# Patient Record
Sex: FEMALE | Race: WHITE | NOT HISPANIC OR LATINO
[De-identification: names, ages, dates, MRNs, and addresses within clinical notes are randomized per-mention and may not be internally consistent; named-entity substitution may affect disease eponyms.]

---

## 2017-12-30 ENCOUNTER — TRANSCRIPTION ENCOUNTER (OUTPATIENT)
Age: 37
End: 2017-12-30

## 2020-02-05 PROBLEM — Z00.00 ENCOUNTER FOR PREVENTIVE HEALTH EXAMINATION: Status: ACTIVE | Noted: 2020-02-05

## 2020-02-28 ENCOUNTER — APPOINTMENT (OUTPATIENT)
Dept: BREAST CENTER | Facility: CLINIC | Age: 40
End: 2020-02-28

## 2020-03-11 ENCOUNTER — APPOINTMENT (OUTPATIENT)
Dept: BREAST CENTER | Facility: CLINIC | Age: 40
End: 2020-03-11
Payer: COMMERCIAL

## 2020-03-11 VITALS
HEIGHT: 65 IN | DIASTOLIC BLOOD PRESSURE: 79 MMHG | BODY MASS INDEX: 24.99 KG/M2 | WEIGHT: 150 LBS | SYSTOLIC BLOOD PRESSURE: 118 MMHG | HEART RATE: 68 BPM

## 2020-03-11 DIAGNOSIS — Z82.49 FAMILY HISTORY OF ISCHEMIC HEART DISEASE AND OTHER DISEASES OF THE CIRCULATORY SYSTEM: ICD-10-CM

## 2020-03-11 DIAGNOSIS — Z86.39 PERSONAL HISTORY OF OTHER ENDOCRINE, NUTRITIONAL AND METABOLIC DISEASE: ICD-10-CM

## 2020-03-11 DIAGNOSIS — Z86.79 PERSONAL HISTORY OF OTHER DISEASES OF THE CIRCULATORY SYSTEM: ICD-10-CM

## 2020-03-11 DIAGNOSIS — Z78.9 OTHER SPECIFIED HEALTH STATUS: ICD-10-CM

## 2020-03-11 DIAGNOSIS — N64.4 MASTODYNIA: ICD-10-CM

## 2020-03-11 DIAGNOSIS — R92.1 MAMMOGRAPHIC CALCIFICATION FOUND ON DIAGNOSTIC IMAGING OF BREAST: ICD-10-CM

## 2020-03-11 PROCEDURE — 99204 OFFICE O/P NEW MOD 45 MIN: CPT

## 2020-03-11 NOTE — PHYSICAL EXAM
[Normocephalic] : normocephalic [Atraumatic] : atraumatic [Supple] : supple [No Supraclavicular Adenopathy] : no supraclavicular adenopathy [Examined in the supine and seated position] : examined in the supine and seated position [Symmetrical] : symmetrical [Grade 1] : Ptosis Grade 1 [No dominant masses] : no dominant masses in right breast  [No dominant masses] : no dominant masses left breast [No Nipple Retraction] : no left nipple retraction [No Nipple Discharge] : no left nipple discharge [No Axillary Lymphadenopathy] : no left axillary lymphadenopathy [No Edema] : no edema [No Rashes] : no rashes [No Ulceration] : no ulceration [de-identified] : L>R, UOQ in area of patient's concern there is lumpy breast tissue

## 2020-03-11 NOTE — CONSULT LETTER
[Dear  ___] : Dear ~MIGUEL, [( Thank you for referring [unfilled] for consultation for _____ )] : Thank you for referring [unfilled] for consultation for [unfilled] [Please see my note below.] : Please see my note below. [Consult Closing:] : Thank you very much for allowing me to participate in the care of this patient.  If you have any questions, please do not hesitate to contact me. [Sincerely,] : Sincerely, [DrAshley  ___] : Dr. LAURENT [FreeTextEntry3] : Joan Danielson MS DO\par Breast Surgeon\par [facility] \[place] [address]\par

## 2020-03-11 NOTE — ASSESSMENT
[FreeTextEntry1] : 40 yo female with dense breast\par plan [facility] AUG 2020-6 month follow up\par plan mg/sono FEB 2021\par reviewed risk status, she is not high risk\par reviewed common etiologies of breast pain including caffeine, hormones and stress\par f/u with me 6 months \par She knows to call or return sooner should any concerns or questions arise.\par \par

## 2020-03-11 NOTE — REVIEW OF SYSTEMS
[Constipation] : constipation [Change In A Mole] : change in a mole [FreeTextEntry7] : heavy periods [de-identified] : rash

## 2020-03-11 NOTE — HISTORY OF PRESENT ILLNESS
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 She is s/p left breast UOQ (brant clip) 2/17/2020 pathology shows benign breast tissue with PASH. Denies any trauma, changes to herbs, diet or supplements. This was her first biopsy. \par \par She does SBE. \par She has noticed a change in her breast or a breast lump.\par She has not noticed a change in her nipple or nipple area.\par She has not noticed a change in the skin of the breast.\par She is not experiencing nipple discharge.\par She is experiencing breast pain. Left breast, painful, lumpy and cystic right before period\par She has not noticed a lump or lymph node under the armpit. \par \par BREAST CANCER RISK FACTORS\par Menarche: 10\par Date of LMP: 2/24/2020\par Menopause: pre\par Grav: 3      Para:  2 (11&9)\par Age at first live birth: 28\par Nursed: yes (2 years with first child, 2 years with second)\par Hysterectomy: no\par Oophorectomy: no\par OCP: no\par HRT: no \par Last pap/pelvic exam: 2018 WNL\par Related family history: no\par Ashkenazi: no \par Mastery risk assessment: BRCAPRO 14.1%, TCv6 11.6%, TCv7 15.3%, Randa 12.1%, Will is not applicable\par BRCA testing: no\par Bra size:  B\par \par Last mammogram:  2/13/2020                            Location: [place]\par Report reviewed.                                 Images reviewed on PACS\par Results: Birads 4B\par Heterogeneously dense\par Left UOQ 2-3:00new regional fine pleomorphic calcs. Rec stereotactic biopsy\par \par Last ultrasound:   2/13/2020                                Location: [place]\par Report reviewed.                                 Images reviewed. \par Results:Birads 2\par No areas os sup on us seen\par \par Last MRI:     never                             Location:\par Report reviewed.\par

## 2020-04-26 ENCOUNTER — MESSAGE (OUTPATIENT)
Age: 40
End: 2020-04-26

## 2020-05-06 LAB
SARS-COV-2 IGG SERPL IA-ACNC: <0.1 INDEX
SARS-COV-2 IGG SERPL QL IA: NEGATIVE

## 2021-02-12 ENCOUNTER — NON-APPOINTMENT (OUTPATIENT)
Age: 41
End: 2021-02-12

## 2021-02-12 DIAGNOSIS — R92.8 OTHER ABNORMAL AND INCONCLUSIVE FINDINGS ON DIAGNOSTIC IMAGING OF BREAST: ICD-10-CM

## 2021-03-08 ENCOUNTER — APPOINTMENT (OUTPATIENT)
Dept: BREAST CENTER | Facility: CLINIC | Age: 41
End: 2021-03-08

## 2021-09-29 ENCOUNTER — APPOINTMENT (OUTPATIENT)
Dept: BREAST CENTER | Facility: CLINIC | Age: 41
End: 2021-09-29

## 2022-09-11 ENCOUNTER — NON-APPOINTMENT (OUTPATIENT)
Age: 42
End: 2022-09-11

## 2023-01-23 ENCOUNTER — APPOINTMENT (OUTPATIENT)
Dept: BREAST CENTER | Facility: CLINIC | Age: 43
End: 2023-01-23
Payer: COMMERCIAL

## 2023-01-23 ENCOUNTER — NON-APPOINTMENT (OUTPATIENT)
Age: 43
End: 2023-01-23

## 2023-01-23 VITALS
SYSTOLIC BLOOD PRESSURE: 112 MMHG | HEIGHT: 65 IN | WEIGHT: 150 LBS | BODY MASS INDEX: 24.99 KG/M2 | HEART RATE: 70 BPM | DIASTOLIC BLOOD PRESSURE: 77 MMHG

## 2023-01-23 DIAGNOSIS — Z12.39 ENCOUNTER FOR OTHER SCREENING FOR MALIGNANT NEOPLASM OF BREAST: ICD-10-CM

## 2023-01-23 PROCEDURE — 99215 OFFICE O/P EST HI 40 MIN: CPT

## 2023-01-23 NOTE — ASSESSMENT
[FreeTextEntry1] : 41 yo female with dense breast and now high risk\par plan mg/sono FEB 2023\par plan MRI March 2023\par reviewed risk status, she is not high risk\par reviewed common etiologies of breast pain including caffeine, hormones and stress\par We reviewed risk reduction strategies including maintaining a BMI <25, limiting red meat intake and alcoholic beverages to 3 per week and exercise (150 min/ week low intensity or 75 min/week high intensity). And maintaining a normal vitamin D level.\par \par f/u with me oct 2023 then annually, gyn 6 months\par She knows to call or return sooner should any concerns or questions arise.\par \par

## 2023-01-23 NOTE — PHYSICAL EXAM
[Normocephalic] : normocephalic [Atraumatic] : atraumatic [Supple] : supple [No Supraclavicular Adenopathy] : no supraclavicular adenopathy [Examined in the supine and seated position] : examined in the supine and seated position [Symmetrical] : symmetrical [Grade 1] : Ptosis Grade 1 [No dominant masses] : no dominant masses in right breast  [No dominant masses] : no dominant masses left breast [No Nipple Retraction] : no left nipple retraction [No Nipple Discharge] : no left nipple discharge [No Axillary Lymphadenopathy] : no left axillary lymphadenopathy [No Edema] : no edema [No Rashes] : no rashes [No Ulceration] : no ulceration [de-identified] : L>R, UOQ in area of patient's concern there is lumpy breast tissue

## 2023-01-23 NOTE — HISTORY OF PRESENT ILLNESS
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 She is s/p left breast UOQ (brant clip) 2/17/2020 pathology shows benign breast tissue with PASH. Denies any trauma, changes to herbs, diet or supplements. This was her first biopsy. \par \par She does SBE. \par She has noticed a change in her breast or a breast lump on left which is painful\par She has not noticed a change in her nipple or nipple area.\par She has not noticed a change in the skin of the breast.\par She is not experiencing nipple discharge.\par She is experiencing breast pain. Left breast, painful, lumpy and cystic right before period\par She has not noticed a lump or lymph node under the armpit. \par \par BREAST CANCER RISK FACTORS\par Menarche: 10\par Date of LMP: 2/24/2020\par Menopause: pre\par Grav: 3      Para:  2 (11&9)\par Age at first live birth: 28\par Nursed: yes (2 years with first child, 2 years with second)\par Hysterectomy: no\par Oophorectomy: no\par OCP: no\par HRT: no \par Last pap/pelvic exam: 2018 WNL\par Related family history: no\par Ashkenazi: no \par Mastery risk assessment: BRCAPRO 13.0%, TCv7 27.7%, TCv8 26.4%, Randa 14.2%, Will is not applicable\par BRCA testing: no\par Bra size:  B\par \par Last mammogram:  2/11/2021                            Location: [place]\par Report reviewed.                                 Images reviewed on PACS\par Results: Birads 2\par Dense breast tissue with no evidence of malignancy. \par \par Last ultrasound:   2/11/2021                                Location: [place]\par Report reviewed.                                 Images reviewed. \par Results:Birads 3\par At 12:00 in the left breast 8cm FN there is a new probable complicated cyst. The residual microcalcs at 2-4:00 in the left breast are not significantly changed and are similar to calcifications that were biopsied with benign results.\par \par Last MRI:     never                             Location:\par Report reviewed.\par

## 2023-01-23 NOTE — CONSULT LETTER
[Dear  ___] : Dear ~MIGUEL, [( Thank you for referring [unfilled] for consultation for _____ )] : Thank you for referring [unfilled] for consultation for [unfilled] [Please see my note below.] : Please see my note below. [Consult Closing:] : Thank you very much for allowing me to participate in the care of this patient.  If you have any questions, please do not hesitate to contact me. [Sincerely,] : Sincerely, [DrAshley  ___] : Dr. LAURENT [FreeTextEntry3] : Joan Danielson MS DO\par Breast Surgeon\par [facility] \[place] [address]\par

## 2023-05-04 ENCOUNTER — RESULT REVIEW (OUTPATIENT)
Age: 43
End: 2023-05-04

## 2023-05-11 ENCOUNTER — RESULT REVIEW (OUTPATIENT)
Age: 43
End: 2023-05-11

## 2023-05-18 ENCOUNTER — NON-APPOINTMENT (OUTPATIENT)
Age: 43
End: 2023-05-18

## 2023-05-29 ENCOUNTER — RESULT REVIEW (OUTPATIENT)
Age: 43
End: 2023-05-29

## 2023-06-01 ENCOUNTER — RESULT REVIEW (OUTPATIENT)
Age: 43
End: 2023-06-01

## 2023-06-05 ENCOUNTER — NON-APPOINTMENT (OUTPATIENT)
Age: 43
End: 2023-06-05

## 2023-06-15 ENCOUNTER — RESULT REVIEW (OUTPATIENT)
Age: 43
End: 2023-06-15

## 2023-06-21 ENCOUNTER — NON-APPOINTMENT (OUTPATIENT)
Age: 43
End: 2023-06-21

## 2023-07-14 ENCOUNTER — APPOINTMENT (OUTPATIENT)
Dept: BREAST CENTER | Facility: CLINIC | Age: 43
End: 2023-07-14
Payer: COMMERCIAL

## 2023-07-14 VITALS
WEIGHT: 150 LBS | HEART RATE: 86 BPM | SYSTOLIC BLOOD PRESSURE: 129 MMHG | BODY MASS INDEX: 24.99 KG/M2 | HEIGHT: 65 IN | DIASTOLIC BLOOD PRESSURE: 78 MMHG

## 2023-07-14 DIAGNOSIS — D24.2 BENIGN NEOPLASM OF LEFT BREAST: ICD-10-CM

## 2023-07-14 DIAGNOSIS — R92.2 INCONCLUSIVE MAMMOGRAM: ICD-10-CM

## 2023-07-14 DIAGNOSIS — Z80.0 FAMILY HISTORY OF MALIGNANT NEOPLASM OF DIGESTIVE ORGANS: ICD-10-CM

## 2023-07-14 DIAGNOSIS — Z12.31 ENCOUNTER FOR SCREENING MAMMOGRAM FOR MALIGNANT NEOPLASM OF BREAST: ICD-10-CM

## 2023-07-14 PROCEDURE — 99214 OFFICE O/P EST MOD 30 MIN: CPT

## 2023-07-14 NOTE — PHYSICAL EXAM
[Normocephalic] : normocephalic [Atraumatic] : atraumatic [Supple] : supple [No Supraclavicular Adenopathy] : no supraclavicular adenopathy [Examined in the supine and seated position] : examined in the supine and seated position [Symmetrical] : symmetrical [Grade 1] : Ptosis Grade 1 [No dominant masses] : no dominant masses in right breast  [No dominant masses] : no dominant masses left breast [No Nipple Retraction] : no left nipple retraction [No Nipple Discharge] : no left nipple discharge [No Axillary Lymphadenopathy] : no left axillary lymphadenopathy [No Edema] : no edema [No Rashes] : no rashes [No Ulceration] : no ulceration [de-identified] : healing bx sites no masses, right lateral nipple sits lower than medial [de-identified] : L>R, UOQ in area of patient's concern there is less lumpy breast tissue

## 2023-07-14 NOTE — REVIEW OF SYSTEMS
[Constipation] : constipation [Change In A Mole] : change in a mole [FreeTextEntry7] : heavy periods [de-identified] : rash

## 2023-07-14 NOTE — ASSESSMENT
[FreeTextEntry1] : 43 yo female with dense breast and now high risk\par pathology reviewde, copy given\par discussed rationale for excision and role for observation\par she would prefer excisional bx at end of September\par We reviewed the procedure of localization (magseed) and excision. We reviewed postop care and recovery. We reviewed the risks of infection, bleeding, hematoma, seroma, deformity, scarring and change of sensation particularly in the nipple with possible loss of sensation. I outlined the procedure and what she should expect on the day of surgery. She understands all of the above and her questions have been answered. She will obtain medical clearance prior to surgery\par I believe I was able to answer all of her questions to her satisfaction\par plan mg/sono FEB 2024\par She knows to call or return sooner should any concerns or questions arise.\par \par

## 2023-07-14 NOTE — HISTORY OF PRESENT ILLNESS
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 She is s/p left breast UOQ (brant clip) 2/17/2020 pathology shows benign breast tissue with PASH. Denies any trauma, changes to herbs, diet or supplements. This was her first biopsy. 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  She is s/p right USGbx (3:00 retro heart clip) on 5/12/2023, pathology showed benign breast tissue with apocrine cyst and focal PASH.\par She presents for surgical consultation.\par \par She does SBE. \par She has noticed a change in her breast or a breast lump on left which is painful\par She has not noticed a change in her nipple or nipple area.\par She has not noticed a change in the skin of the breast.\par She is not experiencing nipple discharge.\par She is experiencing breast pain. Left breast, painful, lumpy and cystic right before period\par She has not noticed a lump or lymph node under the armpit. \par \par BREAST CANCER RISK FACTORS\par Menarche: 10\par Date of LMP: 7/2023\par Menopause: pre\par Grav: 3      Para:  2 (11&9)\par Age at first live birth: 28\par Nursed: yes (2 years with first child, 2 years with second)\par Hysterectomy: no\par Oophorectomy: no\par OCP: no\par HRT: no \par Last pap/pelvic exam: 2021 WNL\par Related family history: no\par Ashkenazi: no \par Mastery risk assessment: BRCAPRO 13.0%, TCv7 27.7%, TCv8 26.4%, Randa 18.4%\par BRCA testing: no\par Bra size:  B\par \par Last mammogram: 5/5/2023                 Location: [facility] \par Report reviewed.                                 Images reviewed on PACS\par Results: Birads 2\par Dense breasts with No evidence of malignancy\par \par Last ultrasound: 5/5/2023                   Location: [facility]\par Report reviewed.                                 Images reviewed. \par Results:Birads 4A\par At 3:00 in the right retroareolar region there is a new slightly angular hypoechoic lesion with associated color flow that could represent an inflammatory cyst or papilloma. USGbx is recommended. A new probable complicated cyst is present at 5:00 in the right retroareolar region. right diag targeted ultrasound is  recommended. No evidence of malignancy in the remainder of the breasts. \par \par Last MRI: 6/2/2023                          Location: [facility] \par Report reviewed.\par Results: BI-RADS 4B \par In the central right breast and enhancing lesion is present with plateau kinetics an angular margins. This may correlate with the hypoechoic lesion seen on ultrasound at 5:00 in the retroareolar region. USGbx is recommended. No additional suspicious MRI abnormality.

## 2023-07-14 NOTE — CONSULT LETTER
[Dear  ___] : Dear ~MIGUEL, [Please see my note below.] : Please see my note below. [Consult Closing:] : Thank you very much for allowing me to participate in the care of this patient.  If you have any questions, please do not hesitate to contact me. [Sincerely,] : Sincerely, [DrAshley  ___] : Dr. LAURENT [Courtesy Letter:] : I had the pleasure of seeing your patient, [unfilled], in my office today. [FreeTextEntry1] : She will be getting right excisional breast biopsy in September. [FreeTextEntry3] : Joan Danielson MS DO\par Breast Surgeon\par [facility] \[place] [address]\par

## 2023-08-21 DIAGNOSIS — D24.1 BENIGN NEOPLASM OF RIGHT BREAST: ICD-10-CM

## 2023-10-03 ENCOUNTER — RESULT REVIEW (OUTPATIENT)
Age: 43
End: 2023-10-03

## 2023-10-05 ENCOUNTER — RESULT REVIEW (OUTPATIENT)
Age: 43
End: 2023-10-05

## 2023-10-05 ENCOUNTER — TRANSCRIPTION ENCOUNTER (OUTPATIENT)
Age: 43
End: 2023-10-05

## 2023-10-05 ENCOUNTER — APPOINTMENT (OUTPATIENT)
Dept: BREAST CENTER | Facility: HOSPITAL | Age: 43
End: 2023-10-05

## 2023-10-12 ENCOUNTER — NON-APPOINTMENT (OUTPATIENT)
Age: 43
End: 2023-10-12

## 2023-10-13 ENCOUNTER — APPOINTMENT (OUTPATIENT)
Dept: BREAST CENTER | Facility: CLINIC | Age: 43
End: 2023-10-13
Payer: COMMERCIAL

## 2023-10-13 PROCEDURE — 99024 POSTOP FOLLOW-UP VISIT: CPT

## 2023-10-17 ENCOUNTER — APPOINTMENT (OUTPATIENT)
Dept: BREAST CENTER | Facility: CLINIC | Age: 43
End: 2023-10-17

## 2023-11-17 ENCOUNTER — APPOINTMENT (OUTPATIENT)
Dept: BREAST CENTER | Facility: CLINIC | Age: 43
End: 2023-11-17
Payer: COMMERCIAL

## 2023-11-17 DIAGNOSIS — D24.1 BENIGN NEOPLASM OF RIGHT BREAST: ICD-10-CM

## 2023-11-17 DIAGNOSIS — N64.89 OTHER SPECIFIED DISORDERS OF BREAST: ICD-10-CM

## 2023-11-17 DIAGNOSIS — Z91.89 OTHER SPECIFIED PERSONAL RISK FACTORS, NOT ELSEWHERE CLASSIFIED: ICD-10-CM

## 2023-11-17 PROCEDURE — 99024 POSTOP FOLLOW-UP VISIT: CPT

## 2023-12-05 ENCOUNTER — RESULT REVIEW (OUTPATIENT)
Age: 43
End: 2023-12-05

## 2024-07-21 ENCOUNTER — RESULT REVIEW (OUTPATIENT)
Age: 44
End: 2024-07-21

## 2024-07-24 ENCOUNTER — NON-APPOINTMENT (OUTPATIENT)
Age: 44
End: 2024-07-24

## 2024-08-14 ENCOUNTER — RESULT REVIEW (OUTPATIENT)
Age: 44
End: 2024-08-14

## 2024-08-15 ENCOUNTER — NON-APPOINTMENT (OUTPATIENT)
Age: 44
End: 2024-08-15

## 2024-09-10 DIAGNOSIS — R92.30 INCONCLUSIVE MAMMOGRAM: ICD-10-CM

## 2024-09-10 DIAGNOSIS — R92.30 DENSE BREASTS, UNSPECIFIED: ICD-10-CM

## 2024-09-10 DIAGNOSIS — R92.2 INCONCLUSIVE MAMMOGRAM: ICD-10-CM

## 2024-09-16 ENCOUNTER — APPOINTMENT (OUTPATIENT)
Dept: BREAST CENTER | Facility: CLINIC | Age: 44
End: 2024-09-16

## 2024-09-16 NOTE — REVIEW OF SYSTEMS
[Constipation] : constipation [Change In A Mole] : change in a mole [FreeTextEntry7] : heavy periods [de-identified] : rash

## 2024-09-16 NOTE — REVIEW OF SYSTEMS
[Constipation] : constipation [Change In A Mole] : change in a mole [FreeTextEntry7] : heavy periods [de-identified] : rash

## 2024-09-16 NOTE — PHYSICAL EXAM
[Normocephalic] : normocephalic [Atraumatic] : atraumatic [Supple] : supple [No Supraclavicular Adenopathy] : no supraclavicular adenopathy [Examined in the supine and seated position] : examined in the supine and seated position [Symmetrical] : symmetrical [Grade 1] : Ptosis Grade 1 [No dominant masses] : no dominant masses in right breast  [No dominant masses] : no dominant masses left breast [No Nipple Retraction] : no left nipple retraction [No Nipple Discharge] : no left nipple discharge [No Axillary Lymphadenopathy] : no left axillary lymphadenopathy [No Edema] : no edema [No Rashes] : no rashes [No Ulceration] : no ulceration [de-identified] : healed periareolar incision

## 2024-09-16 NOTE — HISTORY OF PRESENT ILLNESS
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 She is s/p left breast UOQ (brant clip) 2/17/2020 pathology shows benign breast tissue with PASH. Denies any trauma, changes to herbs, diet or supplements. This was her first biopsy.   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  She is s/p right USGbx (3:00 retro heart clip) on 5/12/2023, pathology showed benign breast tissue with apocrine cyst and focal PASH.  She is now s/p Right EBBX 5:00 on 10/5/2023, pathology showed intraductal papilloma with UDH (5mm, excised). She is doing well postop.  She does SBE.  She has noticed a change in her breast or a breast lump on left which is painful She has not noticed a change in her nipple or nipple area. She has not noticed a change in the skin of the breast. She is not experiencing nipple discharge. She is experiencing breast pain. Left breast, painful, lumpy and cystic right before period She has not noticed a lump or lymph node under the armpit.   BREAST CANCER RISK FACTORS Menarche: 10 Date of LMP: 7/2023 Menopause: pre Grav: 3      Para:  2 (11&9) Age at first live birth: 28 Nursed: yes (2 years with first child, 2 years with second) Hysterectomy: no Oophorectomy: no OCP: no HRT: no  Last pap/pelvic exam: 2021 WNL Related family history: no Ashkenazi: no  Mastery risk assessment: BRCAPRO 13.0%, TCv7 27.7%, TCv8 26.4%, Randa 18.4% BRCA testing: no Bra size:  B  Last mammogram: 07/22/2024                 Location: [facility]  Report reviewed.                                 Images reviewed on PACS Results: Birads 2 The breasts are extremely dense. No suspicious mass, suspicious microcalcifications, or other sign of malignancy is identified.  Waxing and waning circumscribed and partially circumscribed masses are again present bilaterally with the largest located at the posterior upper outer quadrant measuring 3 x 2 cm, correlating with a benign cyst on ultrasound at 3:00 A skin mole with associated calcification is again present in the left axillary region. Interval postoperative architectural distortion is present at the inner lower aspect of the right breast. Stable asymmetry is present at the inner aspect of the right breast approximately 5 cm deep to the nipple. A benign-appearing intramammary lymph node at the posterior upper aspect of the right breast is stable. A metallic marker is again present at the upper outer aspect of the left breast with residual regional microcalcifications in this region similar to calcifications previously biopsied. Round and layering benign-appearing calcifications are present on the right. Additional scattered round benign-appearing calcifications are present.  Last ultrasound:7/22/2024                 Location: [facility] Report reviewed.                                 Images reviewed.  Results:Birads 2 (as above mammo report)  Last MRI: 8/15/2024                         Location: [facility]  Report reviewed. Results: BI-RADS 2 interval postop changes right. No MRI evidence of malignancy

## 2024-09-16 NOTE — ASSESSMENT
[FreeTextEntry1] : doing well plan mg/sono May 2024 plan MRI AUG 2024 f/u with me SEPT 2024 She knows to call or return sooner should any concerns or questions arise.

## 2024-09-16 NOTE — HISTORY OF PRESENT ILLNESS
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 She is s/p left breast UOQ (brant clip) 2/17/2020 pathology shows benign breast tissue with PASH. Denies any trauma, changes to herbs, diet or supplements. This was her first biopsy.   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  She is s/p right USGbx (3:00 retro heart clip) on 5/12/2023, pathology showed benign breast tissue with apocrine cyst and focal PASH.  She is now s/p Right EBBX 5:00 on 10/5/2023, pathology showed intraductal papilloma with UDH (5mm, excised). She is doing well postop.  She does SBE.  She has noticed a change in her breast or a breast lump on left which is painful She has not noticed a change in her nipple or nipple area. She has not noticed a change in the skin of the breast. She is not experiencing nipple discharge. She is experiencing breast pain. Left breast, painful, lumpy and cystic right before period She has not noticed a lump or lymph node under the armpit.   BREAST CANCER RISK FACTORS Menarche: 10 Date of LMP: 7/2023 Menopause: pre Grav: 3      Para:  2 (11&9) Age at first live birth: 28 Nursed: yes (2 years with first child, 2 years with second) Hysterectomy: no Oophorectomy: no OCP: no HRT: no  Last pap/pelvic exam: 2021 WNL Related family history: no Ashkenazi: no  Mastery risk assessment: BRCAPRO 13.0%, TCv7 27.7%, TCv8 26.4%, Randa 18.4% BRCA testing: no Bra size:  B  Last mammogram: 07/22/2024                 Location: [facility]  Report reviewed.                                 Images reviewed on PACS Results: Birads 2 The breasts are extremely dense. No suspicious mass, suspicious microcalcifications, or other sign of malignancy is identified.  Waxing and waning circumscribed and partially circumscribed masses are again present bilaterally with the largest located at the posterior upper outer quadrant measuring 3 x 2 cm, correlating with a benign cyst on ultrasound at 3:00 A skin mole with associated calcification is again present in the left axillary region. Interval postoperative architectural distortion is present at the inner lower aspect of the right breast. Stable asymmetry is present at the inner aspect of the right breast approximately 5 cm deep to the nipple. A benign-appearing intramammary lymph node at the posterior upper aspect of the right breast is stable. A metallic marker is again present at the upper outer aspect of the left breast with residual regional microcalcifications in this region similar to calcifications previously biopsied. Round and layering benign-appearing calcifications are present on the right. Additional scattered round benign-appearing calcifications are present.  Last ultrasound:7/22/2024                 Location: [facility] Report reviewed.                                 Images reviewed.  Results:Birads 2 (as above mammo report)  Last MRI: 8/15/2024                         Location: [facility]  Report reviewed. Results: BI-RADS 2 interval postop changes right. No MRI evidence of malignancy

## 2024-09-16 NOTE — CONSULT LETTER
[Dear  ___] : Dear ~MIGUEL, [Courtesy Letter:] : I had the pleasure of seeing your patient, [unfilled], in my office today. [Please see my note below.] : Please see my note below. [Consult Closing:] : Thank you very much for allowing me to participate in the care of this patient.  If you have any questions, please do not hesitate to contact me. [Sincerely,] : Sincerely, [DrAshley  ___] : Dr. LAURENT [FreeTextEntry1] : She will be getting right excisional breast biopsy in September. [FreeTextEntry3] : Joan Danielson MS DO\par  Breast Surgeon\par  [facility] \[place] [address]\par

## 2024-09-16 NOTE — CONSULT LETTER
[Dear  ___] : Dear ~MIGUEL, [Courtesy Letter:] : I had the pleasure of seeing your patient, [unfilled], in my office today. [Please see my note below.] : Please see my note below. [Consult Closing:] : Thank you very much for allowing me to participate in the care of this patient.  If you have any questions, please do not hesitate to contact me. [Sincerely,] : Sincerely, [DrAshley  ___] : Dr. LAURENT [FreeTextEntry1] : She will be getting right excisional breast biopsy in September. [FreeTextEntry3] : Joan Danielson MS DO\par  Breast Surgeon\par  [facility] \[place] [address]\par

## 2024-09-16 NOTE — PHYSICAL EXAM
[Normocephalic] : normocephalic [Atraumatic] : atraumatic [Supple] : supple [No Supraclavicular Adenopathy] : no supraclavicular adenopathy [Examined in the supine and seated position] : examined in the supine and seated position [Symmetrical] : symmetrical [Grade 1] : Ptosis Grade 1 [No dominant masses] : no dominant masses in right breast  [No dominant masses] : no dominant masses left breast [No Nipple Retraction] : no left nipple retraction [No Nipple Discharge] : no left nipple discharge [No Axillary Lymphadenopathy] : no left axillary lymphadenopathy [No Edema] : no edema [No Rashes] : no rashes [No Ulceration] : no ulceration [de-identified] : healed periareolar incision

## 2024-09-23 ENCOUNTER — APPOINTMENT (OUTPATIENT)
Dept: BREAST CENTER | Facility: CLINIC | Age: 44
End: 2024-09-23

## 2024-09-23 NOTE — CONSULT LETTER
[FreeTextEntry1] : She will be getting right excisional breast biopsy in September. [FreeTextEntry3] : Joan Danielson MS DO\par  Breast Surgeon\par  [facility] \[place] [address]\par

## 2024-09-23 NOTE — HISTORY OF PRESENT ILLNESS
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 She is s/p left breast UOQ (brant clip) 2/17/2020 pathology shows benign breast tissue with PASH. Denies any trauma, changes to herbs, diet or supplements. This was her first biopsy.   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  She is s/p right USGbx (3:00 retro heart clip) on 5/12/2023, pathology showed benign breast tissue with apocrine cyst and focal PASH.  She is now s/p Right EBBX 5:00 on 10/5/2023, pathology showed intraductal papilloma with UDH (5mm, excised). She is doing well postop.  She does SBE.  She has noticed a change in her breast or a breast lump on left which is painful She has not noticed a change in her nipple or nipple area. She has not noticed a change in the skin of the breast. She is not experiencing nipple discharge. She is experiencing breast pain. Left breast, painful, lumpy and cystic right before period She has not noticed a lump or lymph node under the armpit.   BREAST CANCER RISK FACTORS Menarche: 10 Date of LMP: 7/2023 Menopause: pre Grav: 3      Para:  2 (11&9) Age at first live birth: 28 Nursed: yes (2 years with first child, 2 years with second) Hysterectomy: no Oophorectomy: no OCP: no HRT: no  Last pap/pelvic exam: 2021 WNL Related family history: no Ashkenazi: no  Mastery risk assessment: BRCAPRO 13.0%, TCv7 27.7%, TCv8 26.4%, Randa 18.4% BRCA testing: no Bra size:  B  Last mammogram: 07/22/2024                 Location: [facility]  Report reviewed.                                 Images reviewed on PACS Results: Birads 2 The breasts are extremely dense. No suspicious mass, suspicious microcalcifications, or other sign of malignancy is identified.  Waxing and waning circumscribed and partially circumscribed masses are again present bilaterally with the largest located at the posterior upper outer quadrant measuring 3 x 2 cm, correlating with a benign cyst on ultrasound at 3:00 A skin mole with associated calcification is again present in the left axillary region. Interval postoperative architectural distortion is present at the inner lower aspect of the right breast. Stable asymmetry is present at the inner aspect of the right breast approximately 5 cm deep to the nipple. A benign-appearing intramammary lymph node at the posterior upper aspect of the right breast is stable. A metallic marker is again present at the upper outer aspect of the left breast with residual regional microcalcifications in this region similar to calcifications previously biopsied. Round and layering benign-appearing calcifications are present on the right. Additional scattered round benign-appearing calcifications are present.  Last ultrasound:7/22/2024                 Location: [facility] Report reviewed.                                 Images reviewed.  Results:Birads 2 (as above mammo report)  Last MRI: 8/15/2024                         Location: [facility]  Report reviewed. Results: BI-RADS 2 interval postop changes right. No MRI evidence of malignancy

## 2025-01-27 ENCOUNTER — APPOINTMENT (OUTPATIENT)
Dept: BREAST CENTER | Facility: CLINIC | Age: 45
End: 2025-01-27
Payer: COMMERCIAL

## 2025-01-27 VITALS
BODY MASS INDEX: 24.99 KG/M2 | HEIGHT: 65 IN | HEART RATE: 84 BPM | DIASTOLIC BLOOD PRESSURE: 72 MMHG | WEIGHT: 150 LBS | SYSTOLIC BLOOD PRESSURE: 112 MMHG

## 2025-01-27 DIAGNOSIS — Z91.89 OTHER SPECIFIED PERSONAL RISK FACTORS, NOT ELSEWHERE CLASSIFIED: ICD-10-CM

## 2025-01-27 DIAGNOSIS — D24.2 BENIGN NEOPLASM OF LEFT BREAST: ICD-10-CM

## 2025-01-27 DIAGNOSIS — R92.30 INCONCLUSIVE MAMMOGRAM: ICD-10-CM

## 2025-01-27 DIAGNOSIS — D64.9 ANEMIA, UNSPECIFIED: ICD-10-CM

## 2025-01-27 DIAGNOSIS — R92.2 INCONCLUSIVE MAMMOGRAM: ICD-10-CM

## 2025-01-27 DIAGNOSIS — R92.1 MAMMOGRAPHIC CALCIFICATION FOUND ON DIAGNOSTIC IMAGING OF BREAST: ICD-10-CM

## 2025-01-27 DIAGNOSIS — Z12.39 ENCOUNTER FOR OTHER SCREENING FOR MALIGNANT NEOPLASM OF BREAST: ICD-10-CM

## 2025-01-27 DIAGNOSIS — D24.1 BENIGN NEOPLASM OF RIGHT BREAST: ICD-10-CM

## 2025-01-27 DIAGNOSIS — N64.89 OTHER SPECIFIED DISORDERS OF BREAST: ICD-10-CM

## 2025-01-27 PROCEDURE — 99214 OFFICE O/P EST MOD 30 MIN: CPT

## 2025-01-27 RX ORDER — MAGNESIUM OXIDE/MAG AA CHELATE 300 MG
CAPSULE ORAL
Refills: 0 | Status: ACTIVE | COMMUNITY

## 2025-01-27 RX ORDER — IRON/IRON ASP GLY/FA/MV-MIN 38 125-25-1MG
TABLET ORAL
Refills: 0 | Status: ACTIVE | COMMUNITY

## 2025-01-27 RX ORDER — B-COMPLEX WITH VITAMIN C
TABLET ORAL
Refills: 0 | Status: ACTIVE | COMMUNITY

## 2025-01-27 RX ORDER — CHOLECALCIFEROL (VITAMIN D3) 25 MCG
TABLET ORAL
Refills: 0 | Status: ACTIVE | COMMUNITY

## 2025-06-05 NOTE — REVIEW OF SYSTEMS
For information on Fall & Injury Prevention, visit: [email or web address] OR  [email or web address] OR  https://www.cdc.gov/steadi/patient.html [Constipation] : constipation [Change In A Mole] : change in a mole [FreeTextEntry7] : heavy periods [de-identified] : rash